# Patient Record
Sex: MALE | Race: AMERICAN INDIAN OR ALASKA NATIVE
[De-identification: names, ages, dates, MRNs, and addresses within clinical notes are randomized per-mention and may not be internally consistent; named-entity substitution may affect disease eponyms.]

---

## 2018-05-20 ENCOUNTER — HOSPITAL ENCOUNTER (EMERGENCY)
Dept: HOSPITAL 43 - DL.ED | Age: 38
Discharge: HOME | End: 2018-05-20
Payer: COMMERCIAL

## 2018-05-20 DIAGNOSIS — M62.830: ICD-10-CM

## 2018-05-20 DIAGNOSIS — Z87.891: ICD-10-CM

## 2018-05-20 DIAGNOSIS — M54.42: Primary | ICD-10-CM

## 2018-05-20 PROCEDURE — 99284 EMERGENCY DEPT VISIT MOD MDM: CPT

## 2018-05-20 PROCEDURE — 96372 THER/PROPH/DIAG INJ SC/IM: CPT

## 2018-05-20 PROCEDURE — 72131 CT LUMBAR SPINE W/O DYE: CPT

## 2018-05-20 NOTE — EDM.PDOC
ED HPI GENERAL MEDICAL PROBLEM





- General


Chief Complaint: Back Pain or Injury


Stated Complaint: BACK PAIN [id number]


Time Seen by Provider: 05/20/18 09:58


Source of Information: Reports: Patient


History Limitations: Reports: No Limitations





- History of Present Illness


INITIAL COMMENTS - FREE TEXT/NARRATIVE: 





Ed with c/o low back pain with radiation down Left leg, Reports hs of L4 

bulging disc. No problems for couple of years until last belia. States was 

spraying yard yesterday bent over. Pain started around 4pm. Had left over 

hydrocodone and flexeril from 2014, took at 4 pm and again at 7am this . Noted 

today weakness in left leg with increased pain, improved now after medication. 

Pain left buttock





  ** Left Lower Back


Pain Score (Numeric/FACES): 7





- Related Data


 Allergies











Allergy/AdvReac Type Severity Reaction Status Date / Time


 


No Known Allergies Allergy   Verified 05/20/18 09:38











Home Meds: 


 Home Meds





. [No Known Home Meds]  05/20/18 [History]











Past Medical History


Gastrointestinal History: Reports: GERD


Musculoskeletal History: Reports: Back Pain, Chronic





- Past Surgical History


HEENT Surgical History: Reports: Adenoidectomy, Tonsillectomy


Musculoskeletal Surgical History: Reports: Shoulder Surgery





Social & Family History





- Family History


Family Medical History: Noncontributory





- Tobacco Use


Smoking Status *Q: Former Smoker


Used Tobacco, but Quit: Yes


Month/Year Tobacco Last Used: 8/2017





- Caffeine Use


Caffeine Use: Reports: Coffee





- Alcohol Use


Days Per Week of Alcohol Use: 7


Number of Drinks Per Day: 1


Total Drinks Per Week: 7





- Recreational Drug Use


Recreational Drug Use: No





ED ROS GENERAL





- Review of Systems


Review Of Systems: ROS reveals no pertinent complaints other than HPI.





ED EXAM,LOWER BACK PAIN/INJURY





- Physical Exam


Exam: See Below


Exam Limited By: No Limitations


General Appearance: Alert, Moderate Distress


Eye Exam: Bilateral Eye: EOMI, PERRL


Ears: Normal External Exam, Normal TMs


Nose: Normal Inspection


Throat/Mouth: Normal Inspection


Head: Atraumatic, Normocephalic


Neck: Normal Inspection


Respiratory/Chest: No Respiratory Distress, Lungs Clear, Normal Breath Sounds


Cardiovascular: Normal Peripheral Pulses, Regular Rate, Rhythm


GI/Abdominal: Normal Bowel Sounds, Soft


Back Exam: Paraspinal Tenderness (lefty), Vertebral Tenderness (mild llow lumbar

)


Extremities: Normal Inspection


Neurological: Alert, Normal Mood/Affect, Normal Reflexes, No Motor/Sensory 

Deficits, Oriented x 3, Straight Leg Raise (L).  No: Normal Gait (stooped), 

Abnormal Light Touch, Saddle Anesthesia


DTR - Lower Extremities: 2+: Knee (R), Knee (L)


Skin Exam: Warm, Dry, Intact, Normal Color





Course





- Vital Signs


Last Recorded V/S: 


 Last Vital Signs











Temp  98.7 F   05/20/18 09:38


 


Pulse  83   05/20/18 09:38


 


Resp  18   05/20/18 09:38


 


BP  146/91 H  05/20/18 09:38


 


Pulse Ox  99   05/20/18 09:38














- Orders/Labs/Meds


Orders: 


 Active Orders 24 hr











 Category Date Time Status


 


 Lumbar Spine wo Cont [CT] Urgent Exams  05/20/18 10:07 Taken











Meds: 


Medications














Discontinued Medications














Generic Name Dose Route Start Last Admin





  Trade Name Arleen  PRN Reason Stop Dose Admin


 


Ketorolac Tromethamine  30 mg  05/20/18 10:13  05/20/18 10:20





  Toradol  IM  05/20/18 10:14  30 mg





  ONETIME ONE   Administration





     





     





     





     














- Radiology Interpretation


Free Text/Narrative:: 





Lumbar CT





Departure





- Departure


Time of Disposition: 11:24


Disposition: Home, Self-Care 01


Condition: Good


Clinical Impression: 


 Spasm of muscle of lower back





Low back pain


Qualifiers:


 Chronicity: acute Back pain laterality: left Sciatica presence: with sciatica 

Sciatica laterality: sciatica of left side Qualified Code(s): M54.42 - Lumbago 

with sciatica, left side








- Discharge Information


Instructions:  Back Pain, Adult, Easy-to-Read


Forms:  ED Department Discharge


Additional Instructions: 


Flexeril 10mg one every 8 hours as needed for muscle spasm #15


prednisone 40mg today then 20mg daily for 5 days -take with food


hydrocodone 10/325  one every 6 hours  as needed for severe pain #20


ice to low back


follow up in clinic this week with primary care provider,


urgent follow up if loss of sensation, loss of bowel or bladder control


light activity , no lifting, no driving while taking muscle relaxer or pain 

medication








- My Orders


Last 24 Hours: 


My Active Orders





05/20/18 10:07


Lumbar Spine wo Cont [CT] Urgent 














- Assessment/Plan


Last 24 Hours: 


My Active Orders





05/20/18 10:07


Lumbar Spine wo Cont [CT] Urgent